# Patient Record
Sex: MALE
[De-identification: names, ages, dates, MRNs, and addresses within clinical notes are randomized per-mention and may not be internally consistent; named-entity substitution may affect disease eponyms.]

---

## 2018-08-28 ENCOUNTER — APPOINTMENT (OUTPATIENT)
Dept: SURGERY | Facility: CLINIC | Age: 43
End: 2018-08-28
Payer: COMMERCIAL

## 2018-08-28 VITALS
HEART RATE: 54 BPM | SYSTOLIC BLOOD PRESSURE: 103 MMHG | WEIGHT: 136 LBS | HEIGHT: 67 IN | TEMPERATURE: 97.8 F | BODY MASS INDEX: 21.35 KG/M2 | DIASTOLIC BLOOD PRESSURE: 66 MMHG

## 2018-08-28 PROBLEM — Z00.00 ENCOUNTER FOR PREVENTIVE HEALTH EXAMINATION: Status: ACTIVE | Noted: 2018-08-28

## 2018-08-28 PROCEDURE — 99203 OFFICE O/P NEW LOW 30 MIN: CPT

## 2023-02-15 ENCOUNTER — APPOINTMENT (OUTPATIENT)
Dept: ORTHOPEDIC SURGERY | Facility: CLINIC | Age: 48
End: 2023-02-15
Payer: COMMERCIAL

## 2023-02-15 PROCEDURE — 99204 OFFICE O/P NEW MOD 45 MIN: CPT

## 2023-02-15 PROCEDURE — 72050 X-RAY EXAM NECK SPINE 4/5VWS: CPT

## 2023-02-15 NOTE — HISTORY OF PRESENT ILLNESS
[de-identified] : 47-year-old male presents to me with left shoulder pain and left neck pain.  The shoulder pain is more constant and worse than the left-sided neck pain.  Denies any radiation lower shoulder.  Pain is anterior posterior shoulder.  He denies a specific injury however he does work for the episode where he has to thinks that takes a toll on his body years ago he did have a however.

## 2023-02-15 NOTE — DISCUSSION/SUMMARY
[de-identified] : 47-year-old male with left shoulder pain.  I do not think that the neck is the primary source of the patient's problems.  I am recommending physical therapy and follow-up with one of our shoulder specialist if physical therapy does not help.

## 2023-02-15 NOTE — PHYSICAL EXAM
[de-identified] : Left shoulder: 5-5 strength rotator cuff muscles.  Tenderness to palpation over the left bicipital groove.  Full range of active and passive motion.  Negative impingement sign.  Negative Yergason test.

## 2023-02-15 NOTE — DATA REVIEWED
[FreeTextEntry1] : I obtained AP lateral flexion-extension cervical x-rays in the office today.  Mild degeneration.  No instability.  I reviewed the patient's report of his left shoulder x-ray done at [facility].  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

## 2023-03-20 ENCOUNTER — APPOINTMENT (OUTPATIENT)
Dept: ORTHOPEDIC SURGERY | Facility: CLINIC | Age: 48
End: 2023-03-20

## 2023-03-31 DIAGNOSIS — M51.36 OTHER INTERVERTEBRAL DISC DEGENERATION, LUMBAR REGION: ICD-10-CM

## 2024-10-22 ENCOUNTER — APPOINTMENT (OUTPATIENT)
Dept: NEUROLOGY | Facility: CLINIC | Age: 49
End: 2024-10-22
Payer: COMMERCIAL

## 2024-10-22 VITALS
HEART RATE: 51 BPM | WEIGHT: 135 LBS | HEIGHT: 67 IN | DIASTOLIC BLOOD PRESSURE: 72 MMHG | SYSTOLIC BLOOD PRESSURE: 109 MMHG | BODY MASS INDEX: 21.19 KG/M2

## 2024-10-22 DIAGNOSIS — M54.16 RADICULOPATHY, LUMBAR REGION: ICD-10-CM

## 2024-10-22 PROCEDURE — 99203 OFFICE O/P NEW LOW 30 MIN: CPT

## 2024-11-04 ENCOUNTER — APPOINTMENT (OUTPATIENT)
Dept: NEUROLOGY | Facility: CLINIC | Age: 49
End: 2024-11-04
Payer: COMMERCIAL

## 2024-11-04 PROCEDURE — 95912 NRV CNDJ TEST 11-12 STUDIES: CPT

## 2024-11-04 PROCEDURE — 95886 MUSC TEST DONE W/N TEST COMP: CPT

## 2024-12-03 ENCOUNTER — APPOINTMENT (OUTPATIENT)
Dept: NEUROLOGY | Facility: CLINIC | Age: 49
End: 2024-12-03

## 2025-01-28 ENCOUNTER — APPOINTMENT (OUTPATIENT)
Dept: PAIN MANAGEMENT | Facility: CLINIC | Age: 50
End: 2025-01-28
Payer: COMMERCIAL

## 2025-01-28 DIAGNOSIS — M54.16 RADICULOPATHY, LUMBAR REGION: ICD-10-CM

## 2025-01-28 DIAGNOSIS — Z87.891 PERSONAL HISTORY OF NICOTINE DEPENDENCE: ICD-10-CM

## 2025-01-28 PROCEDURE — 99204 OFFICE O/P NEW MOD 45 MIN: CPT

## 2025-01-28 RX ORDER — IBUPROFEN 800 MG/1
800 TABLET ORAL TWICE DAILY
Qty: 60 | Refills: 0 | Status: ACTIVE | COMMUNITY
Start: 2025-01-28 | End: 1900-01-01

## 2025-02-13 ENCOUNTER — APPOINTMENT (OUTPATIENT)
Dept: PAIN MANAGEMENT | Facility: CLINIC | Age: 50
End: 2025-02-13